# Patient Record
Sex: MALE | Race: WHITE | URBAN - METROPOLITAN AREA
[De-identification: names, ages, dates, MRNs, and addresses within clinical notes are randomized per-mention and may not be internally consistent; named-entity substitution may affect disease eponyms.]

---

## 2023-09-13 ENCOUNTER — HOSPITAL ENCOUNTER (OUTPATIENT)
Dept: DATA CONVERSION | Facility: HOSPITAL | Age: 88
Discharge: HOME | End: 2023-09-13

## 2023-09-13 DIAGNOSIS — M25.552 PAIN IN LEFT HIP: ICD-10-CM

## 2023-09-28 ENCOUNTER — HOSPITAL ENCOUNTER (OUTPATIENT)
Dept: DATA CONVERSION | Facility: HOSPITAL | Age: 88
Discharge: HOME | End: 2023-09-28
Payer: MEDICARE

## 2023-10-12 PROBLEM — R42 DIZZINESS: Status: ACTIVE | Noted: 2023-10-12

## 2023-10-12 PROBLEM — N17.9 ACUTE RENAL FAILURE SYNDROME (CMS-HCC): Status: ACTIVE | Noted: 2023-10-12

## 2023-10-12 PROBLEM — N23 RENAL COLIC: Status: ACTIVE | Noted: 2023-10-12

## 2023-10-12 PROBLEM — R33.8 ACUTE RETENTION OF URINE: Status: ACTIVE | Noted: 2023-10-12

## 2023-10-12 PROBLEM — H91.90 HEARING LOSS: Status: ACTIVE | Noted: 2023-10-12

## 2023-10-12 RX ORDER — CEPHALEXIN 500 MG/1
500 CAPSULE ORAL 3 TIMES DAILY
COMMUNITY
Start: 2022-11-21

## 2023-10-12 RX ORDER — MECLIZINE HYDROCHLORIDE 25 MG/1
25 TABLET ORAL 3 TIMES DAILY PRN
COMMUNITY
Start: 2022-11-21

## 2023-10-13 ENCOUNTER — TREATMENT (OUTPATIENT)
Dept: PHYSICAL THERAPY | Facility: CLINIC | Age: 88
End: 2023-10-13
Payer: MEDICARE

## 2023-10-13 DIAGNOSIS — M54.16 LEFT LUMBAR RADICULITIS: Primary | ICD-10-CM

## 2023-10-13 DIAGNOSIS — M25.552 PAIN IN LEFT HIP: ICD-10-CM

## 2023-10-13 DIAGNOSIS — M25.552 LEFT HIP PAIN: ICD-10-CM

## 2023-10-13 PROCEDURE — 97140 MANUAL THERAPY 1/> REGIONS: CPT | Mod: GP | Performed by: PHYSICAL THERAPIST

## 2023-10-13 PROCEDURE — 97110 THERAPEUTIC EXERCISES: CPT | Mod: GP | Performed by: PHYSICAL THERAPIST

## 2023-10-13 NOTE — PROGRESS NOTES
"Physical Therapy    Physical Therapy Treatment    Patient Name: Francis Martinez  MRN: [id number]  Today's Date: 10/13/2023  Time Calculation  Start Time: 1043  Stop Time: 1122  Time Calculation (min): 39 min    Visit 2/10  Assessment:   0 pain after tx, pt needed tc's to illicit F- TrA    Plan:   Continue per plan    Current Problem  1. Left lumbar radiculitis        2. Pain in left hip  PT eval and treat      3. Left hip pain            Subjective   General  General Comment: much less pain with doing HEP  Precautions   none    Pain   1    Objective   FIS min decr  EIS WFL    There-ex:  Tra 2 x 10, 5\"  Prone lye 2'  LIE 5 x 10\"  Ios hip add ball 5\" 2 x 10  Hip abd GTband 2 x 10    STM LB to decr pain and mm tension    STG:  I IN posture correction    LTG:  Pt to incr MMT of hips/core to 5/5 for less pain with IADL.  Pt to incr HS FLEXIBILITY to min decr for bending with less pain.  Decr pain level by 50% for lying and sitting.    "

## 2023-10-17 ENCOUNTER — TREATMENT (OUTPATIENT)
Dept: PHYSICAL THERAPY | Facility: CLINIC | Age: 88
End: 2023-10-17
Payer: MEDICARE

## 2023-10-17 DIAGNOSIS — M54.16 LEFT LUMBAR RADICULITIS: Primary | ICD-10-CM

## 2023-10-17 DIAGNOSIS — M25.552 LEFT HIP PAIN: ICD-10-CM

## 2023-10-17 DIAGNOSIS — M25.552 PAIN IN LEFT HIP: ICD-10-CM

## 2023-10-17 PROCEDURE — 97530 THERAPEUTIC ACTIVITIES: CPT | Mod: GP | Performed by: PHYSICAL THERAPIST

## 2023-10-17 PROCEDURE — 97110 THERAPEUTIC EXERCISES: CPT | Mod: GP | Performed by: PHYSICAL THERAPIST

## 2023-10-17 NOTE — PROGRESS NOTES
"Treatment  10/17/2023  [facility]       Luís Gordon, PT  Physical Therapy Left lumbar radiculitis +2 more  Dx PT Treatment; Referred by Tiff Ojeda DO  Reason for Visit     Progress Notes  Luís Gordon, PT (Physical Therapist)  Physical Therapy  Expand All Collapse All  Physical Therapy     Physical Therapy Treatment     Patient Name: Francis Martinez  MRN: [id number]  Today's Date: 10/17/2023  Time Calculation  Start Time: 1130  Stop Time: 1210  Time Calculation (min):40 min     Visit 3/10  Assessment:   4/10 L LB pain after tx, pt needed tc's to relax rectus with EIL  Plan:   assess response to cp and EIL at home     Current Problem  1. Left lumbar radiculitis          2. Pain in left hip  PT eval and treat       3. Left hip pain                   Subjective   General  General Comment: pt had less pain, but then it got worse again.  He is doing the new HEP at home.  Precautions   none     Pain   4-L LB and and groin     Objective []Expand by Default  Stand posture: trunk mild shift to the R    POSTURE:  seated F  POSTURE CORRECTION:  P     NE=No effect, C=centralize, A=abolish, P=peripheralize, p=produce, B=better, D=decrease, I=increase, NW=no worse, NB=no better     ROM S/S   FIS min NE   EIS wfl D   SG L WFL D   SG R MOD/marked decr W   Rep FIS D NB   Rep EIS I NW   Rep EIL D/C B          Pt provisional classification posterior derangement, now unloaded extension preference, but possible relevant lateral component    There-ex:  Prone lye 2'  LIE 5 x 10\"  ISO hip add ball 5\" 2 x 10  Hip abd GTband 2 x 10       DNP:  Tra 2 x 10, 5\"  STM LB to decr pain and mm tension     STG:  I IN posture correction     LTG:  Pt to incr MMT of hips/core to 5/5 for less pain with IADL.  Pt to incr HS FLEXIBILITY to min decr for bending with less pain.  Decr pain level by 50% for lying and sitting.           "

## 2023-10-19 ENCOUNTER — TREATMENT (OUTPATIENT)
Dept: PHYSICAL THERAPY | Facility: CLINIC | Age: 88
End: 2023-10-19
Payer: MEDICARE

## 2023-10-19 DIAGNOSIS — M25.552 PAIN IN LEFT HIP: ICD-10-CM

## 2023-10-19 DIAGNOSIS — M54.16 LEFT LUMBAR RADICULITIS: Primary | ICD-10-CM

## 2023-10-19 PROCEDURE — 97530 THERAPEUTIC ACTIVITIES: CPT | Mod: GP | Performed by: PHYSICAL THERAPIST

## 2023-10-19 PROCEDURE — 97014 ELECTRIC STIMULATION THERAPY: CPT | Mod: GP | Performed by: PHYSICAL THERAPIST

## 2023-10-19 NOTE — PROGRESS NOTES
"Treatment  10/17/2023  [facility]       Luís Gordon, PT  Physical Therapy Left lumbar radiculitis +2 more  Dx PT Treatment; Referred by Tiff Ojeda DO  Reason for Visit     Progress Notes  Luís Gordon, PT (Physical Therapist)  Physical Therapy  Expand All Collapse All  Physical Therapy     Physical Therapy Treatment     Patient Name: Francis Martinez  MRN: [id number]  Today's Date: 10/19/2023  Time Calculation  Start Time: 1432  Stop Time: 1510  Time Calculation (min): 38 min     Visit 4/10  Assessment:   cp and EIL at home did not help.  Plan to continue core stab, but to abandon press ups, EIS.  Plan:   advised pt consider aquatics for decompression of L spine     Current Problem  1. Left lumbar radiculitis          2. Pain in left hip  PT eval and treat       3. Left hip pain                Subjective   General  General Comment: pt states no change  Precautions   none     Pain   4-L LB and and groin     Objective []Expand by Default    objective testing and assessment of direction preference.  Pt is not responding to any direction performed today, or to lumbar PA and rot mobs trialed today.    Thermal:  Moist hot pack  Applied to:  lb  Patient position:  Seated  Comment:  to decr LBP    Time:  10'-with stim    Unattended electrical stimulation:  Other  Parameters:  Other  Patient position:  Seated  Electrodes placed:  L LB and L hip  Comment:  pre-mod, 24 intensity, to decr pain    Time:  10    Pt ed on aquatics and how may be beneficial, shown pool area, etc.    DNP:  There-ex:  Prone lye 2'  LIE 5 x 10\"  ISO hip add ball 5\" 2 x 10  Hip abd GTband 2 x 10     Tra 2 x 10, 5\"  STM LB to decr pain and mm tension     STG:  I IN posture correction     LTG:  Pt to incr MMT of hips/core to 5/5 for less pain with IADL.  Pt to incr HS FLEXIBILITY to min decr for bending with less pain.  Decr pain level by 50% for lying and sitting.           "

## 2023-10-24 ENCOUNTER — TREATMENT (OUTPATIENT)
Dept: PHYSICAL THERAPY | Facility: CLINIC | Age: 88
End: 2023-10-24
Payer: MEDICARE

## 2023-10-24 DIAGNOSIS — M25.552 PAIN IN LEFT HIP: ICD-10-CM

## 2023-10-24 DIAGNOSIS — M54.16 LEFT LUMBAR RADICULITIS: Primary | ICD-10-CM

## 2023-10-24 DIAGNOSIS — M25.552 LEFT HIP PAIN: ICD-10-CM

## 2023-10-24 PROCEDURE — 97110 THERAPEUTIC EXERCISES: CPT | Mod: GP | Performed by: PHYSICAL THERAPIST

## 2023-10-24 PROCEDURE — 97014 ELECTRIC STIMULATION THERAPY: CPT | Mod: GP | Performed by: PHYSICAL THERAPIST

## 2023-10-24 NOTE — PROGRESS NOTES
"Treatment  10/24/2023  [facility]       Luís Gordon, PT  Physical Therapy Left lumbar radiculitis +2 more  Dx PT Treatment; Referred by Tiff Ojeda DO  Reason for Visit     Progress Notes  Luís Gordon, PT (Physical Therapist)  Physical Therapy  Expand All Collapse All  Physical Therapy     Physical Therapy Treatment     Patient Name: Francis Martinez  MRN: [id number]  Today's Date: 10/24/2023  Time Calculation  Start Time: 1144  Stop Time: 1225  Time Calculation (min): 41 min     Visit 4/10  Assessment:  Plan to continue core stab, but to abandon press ups, EIS.  Plan:  CONTINUE core stab, flexibility work and modalities as needed     Current Problem  1. Left lumbar radiculitis          2. Pain in left hip  PT eval and treat       3. Left hip pain                Subjective   General  General Comment: pt states much better since last session, so does not think he will need pool.  Precautions   none     Pain   1/10 L LB pre, 0/10 post tx  Objective []Expand by Default  F+ PPT    Light therapy: infra and red light  Applied to:  L psis region  Patient position:  Left side lying  Comment:  Pt educated on light therapy and agrees to have this administered.  Eye protection utilized.    Time:  44\" x 3    Thermal:  Moist hot pack  Applied to:  lb  Patient position:  Seated  Comment:  to decr LBP. Skin checks OK    Time:  12'-with stim.      Unattended electrical stimulation:  Other  Parameters:  Other  Patient position:  Seated  Electrodes placed:  L LB and L hip  Comment:  pre-mod, 21 intensity, to decr pain    Time:  12'    There-ex:  PPT 5\" x 10  Bent knee fall outs 5\" x 10 ea  ISO hip add ball 5\" 2 x 10  Hip abd GTband 2 x 10  PPT with march x 10  Tra 2 x 10, 5\"  Hamstring stretch  2 x 30\" ea dnp         STG:  I IN posture correction     LTG:  Pt to incr MMT of hips/core to 5/5 for less pain with IADL.  Pt to incr HS FLEXIBILITY to min decr for bending with less pain.  " Decr pain level by 50% for lying and sitting.

## 2023-10-26 ENCOUNTER — TREATMENT (OUTPATIENT)
Dept: PHYSICAL THERAPY | Facility: CLINIC | Age: 88
End: 2023-10-26
Payer: MEDICARE

## 2023-10-26 DIAGNOSIS — M54.16 LEFT LUMBAR RADICULITIS: Primary | ICD-10-CM

## 2023-10-26 DIAGNOSIS — M25.552 LEFT HIP PAIN: ICD-10-CM

## 2023-10-26 DIAGNOSIS — M25.552 PAIN IN LEFT HIP: ICD-10-CM

## 2023-10-26 PROCEDURE — 97110 THERAPEUTIC EXERCISES: CPT | Mod: GP | Performed by: PHYSICAL THERAPIST

## 2023-10-26 NOTE — PROGRESS NOTES
"Treatment  10/26/2023  [facility]       Luís Gordon, PT  Physical Therapy Left lumbar radiculitis +2 more  Dx PT Treatment; Referred by Tiff Ojeda DO  Reason for Visit     Progress Notes  Luís Gordon, PT (Physical Therapist)  Physical Therapy  Expand All Collapse All  Physical Therapy     Physical Therapy Treatment     Patient Name: Francis Martinez  MRN: [id number]  Today's Date: 10/26/2023  Time Calculation  Start Time: 1426  Stop Time: 1506  Time Calculation (min): 39 min     Visit 5/10  Assessment:  Plan to continue core stab, but to abandon press ups, EIS.  Plan:  Add more stretching at home     Current Problem  1. Left lumbar radiculitis          2. Pain in left hip  PT eval and treat       3. Left hip pain                Subjective   General  General Comment: pt states much better.  Precautions   none     Pain   1/10 L LB pre, 0/10 post tx  Objective []Expand by Default  F+ PPT    There-ex:  PPT 5\" 2 x 10  Bent knee fall outs 5\" x 10 ea  ISO hip add ball 5\" 2 x 10  Hip abd GTband 2 x 10  PPT with march x 10  Tra 2 x 10, 5\"  Hamstring stretch  3 x 30\" ea   Piriformis stretch 3 x 30\"  hip flexor stretch 2 x 30\" ea      DNP:  B Shoulder flexion with rainbow ball, maintaining PPT 2 x 10 and x 10 ea diagonal  Light therapy: infra and red light  Applied to:  L psis region  Patient position:  Left side lying  Comment:  Pt educated on light therapy and agrees to have this administered.  Eye protection utilized.    Time:  44\" x 3    Thermal:  Moist hot pack  Applied to:  lb  Patient position:  Seated  Comment:  to decr LBP. Skin checks OK    Time:  12'-with stim.      Unattended electrical stimulation:  Other  Parameters:  Other  Patient position:  Seated  Electrodes placed:  L LB and L hip  Comment:  pre-mod, 21 intensity, to decr pain    Time:  12'    HEP issued 10/24:       STG:  I IN posture correction     LTG:  Pt to incr MMT of hips/core to 5/5 for less pain " with IADL.  Pt to incr HS FLEXIBILITY to min decr for bending with less pain.  Decr pain level by 50% for lying and sitting.

## 2023-11-02 ENCOUNTER — TREATMENT (OUTPATIENT)
Dept: PHYSICAL THERAPY | Facility: CLINIC | Age: 88
End: 2023-11-02
Payer: MEDICARE

## 2023-11-02 DIAGNOSIS — M54.16 LEFT LUMBAR RADICULITIS: Primary | ICD-10-CM

## 2023-11-02 DIAGNOSIS — M25.552 PAIN IN LEFT HIP: ICD-10-CM

## 2023-11-02 DIAGNOSIS — M25.552 LEFT HIP PAIN: ICD-10-CM

## 2023-11-02 PROCEDURE — 97110 THERAPEUTIC EXERCISES: CPT | Mod: GP | Performed by: PHYSICAL THERAPIST

## 2023-11-02 NOTE — PROGRESS NOTES
"Treatment  11/2/23  [facility]       Luís Gordon, PT  Physical Therapy Left lumbar radiculitis +2 more  Dx PT Treatment; Referred by Tiff Ojeda DO  Reason for Visit     Progress Notes  Luís Gordon, PT (Physical Therapist)  Physical Therapy  Expand All Collapse All  Physical Therapy     Physical Therapy Treatment     Patient Name: Francis Martinez  MRN: [id number]  Today's Date: 11/2/2023  Time Calculation  Start Time: 1516  Stop Time: 1554  Time Calculation (min): 39 min     Visit 5/10  Assessment:  Plan to continue core stab, but to abandon press ups, EIS.  Plan:  Add more stretching at home     Current Problem  1. Left lumbar radiculitis          2. Pain in left hip  PT eval and treat       3. Left hip pain                Subjective   General  General Comment: pt states much better, but stiff from raking leaves.    Precautions   none     Pain   0/10 L LB pre, 0/10 post tx  Objective []Expand by Default  F+/G-PPT    There-ex:  PPT 5\" 2 x 10  Bent knee fall outs 10\" x 5 ea  ISO hip add ball 3\" 2 x 10  Hip abd GTband 2 x 10  PPT with march x 10  Tra 2 x 10, 5\"  Hamstring stretch  3 x 30\" ea   Piriformis stretch 3 x 30\"  hip flexor stretch 2 x 30\" ea  Quad stretch 2 X 30\" EA  ITB 2 x 30\" ea    DNP:  B Shoulder flexion with rainbow ball, maintaining PPT 2 x 10 and x 10 ea diagonal  Light therapy: infra and red light  Applied to:  L psis region  Patient position:  Left side lying  Comment:  Pt educated on light therapy and agrees to have this administered.  Eye protection utilized.    Thermal:  Moist hot pack  Applied to:  lb  Patient position:  Seated  Comment:  to decr LBP. Skin checks OK    Unattended electrical stimulation:  Other  Parameters:  Other  Patient position:  Seated  Electrodes placed:  L LB and L hip  Comment:  pre-mod, 21 intensity, to decr pain      HEP issued 10/24:       STG:  I IN posture correction     LTG:  Pt to incr MMT of hips/core to 5/5 for " less pain with IADL.  Pt to incr HS FLEXIBILITY to min decr for bending with less pain.  Decr pain level by 50% for lying and sitting.            None

## 2023-11-07 ENCOUNTER — APPOINTMENT (OUTPATIENT)
Dept: PHYSICAL THERAPY | Facility: CLINIC | Age: 88
End: 2023-11-07
Payer: MEDICARE

## 2023-11-09 ENCOUNTER — TREATMENT (OUTPATIENT)
Dept: PHYSICAL THERAPY | Facility: CLINIC | Age: 88
End: 2023-11-09
Payer: MEDICARE

## 2023-11-09 DIAGNOSIS — M25.552 PAIN IN LEFT HIP: ICD-10-CM

## 2023-11-09 DIAGNOSIS — M54.16 LEFT LUMBAR RADICULITIS: Primary | ICD-10-CM

## 2023-11-09 DIAGNOSIS — M25.552 LEFT HIP PAIN: ICD-10-CM

## 2023-11-09 PROCEDURE — 97110 THERAPEUTIC EXERCISES: CPT | Mod: GP | Performed by: PHYSICAL THERAPIST

## 2023-11-09 ASSESSMENT — PAIN - FUNCTIONAL ASSESSMENT: PAIN_FUNCTIONAL_ASSESSMENT: 0-10

## 2023-11-09 ASSESSMENT — PAIN SCALES - GENERAL: PAINLEVEL_OUTOF10: 0 - NO PAIN

## 2023-11-09 NOTE — PROGRESS NOTES
"Physical Therapy Treatment    Patient Name: Francis Martinez  MRN: [id number]  Encounter date:  11/9/2023  Time Calculation  Start Time: 1430  Stop Time: 1510  Time Calculation (min): 40 min    Visit Number:  6 / 10   Visit Authorized:  10 per POC    Current Problem  1. Left lumbar radiculitis        2. Pain in left hip  PT eval and treat      3. Left hip pain          Precautions  Precautions  Precautions Comment: no precautions    Pain  Pain Assessment: 0-10  Pain Score: 0 - No pain    Subjective  General     Response to Previous Treatment: Patient with no complaints from previous session.  Trying to do exercises daily and is busy racking leaves;      Objective  Difficulty  abdominal brace from PPT,      Treatment:  Therapeutic Exercises  Bent knee fall outs 10\" x 5 ea  ISO hip add ball 3\" 2 x 10  Hip abd GTband 2 x 10  Brace with march x 10  Tra 2 x 10, 5\"  Brace with fall outs x 10   Brace with alternate UE x 10  Hamstring stretch   edge of chair 10 sec x 3 bilateral  Standing with core activation rows green band x 10   Standing with core activation shoulder extension green band x 10   Piriformis stretch 3 x 10 sec push/pull seated    Green band issued for HEP     Billed Treatment Times:  Therapeutic Exercise 40 min    OP EDUCATION:  Education  Individual(s) Educated: Patient  Education Provided: Anatomy, Home Exercise Program, POC  Patient/Caregiver Demonstrated Understanding: yes  Plan of Care Discussed and Agreed Upon: yes  Patient Response to Education: Patient/Caregiver Verbalized Understanding of Information, Patient/Caregiver Performed Return Demonstration of Exercises/Activities    Assessment:  PT Assessment  Rehab Prognosis: Good  Pt's response to treatment:  Patient needed cues on proper breathing during exercise.  Despite feeling no pain patient wants to keep next 2 sessions with primary therapist     Pain end of session:  0    Plan:  OP PT Plan  Treatment/Interventions: Education/ Instruction, " Electrical stimulation, Laser, Manual therapy, Neuromuscular re-education, Therapeutic exercises, Therapeutic activities  PT Plan: Skilled PT  PT Frequency: 2 times per week  Duration: 10 sessions  Certification Period End Date: 12/31/23  Number of Treatments Authorized: 10 sessions  Rehab Potential: Good  Plan of Care Agreement: Patient  Continue with current POC/no changes    Assessment of current progress against goals:  Progressing toward functional goals    OP Education:  Education  Individual(s) Educated: Patient  Education Provided: Anatomy, Home Exercise Program, POC  Patient/Caregiver Demonstrated Understanding: yes  Plan of Care Discussed and Agreed Upon: yes  Patient Response to Education: Patient/Caregiver Verbalized Understanding of Information, Patient/Caregiver Performed Return Demonstration of Exercises/Activities    Goals:  STGS  I IN posture correction     LTG:  Pt to incr MMT of hips/core to 5/5 for less pain with IADL.  Pt to incr HS FLEXIBILITY to min decr for bending with less pain.  Decr pain level by 50% for lying and sitting.

## 2023-11-14 ENCOUNTER — TREATMENT (OUTPATIENT)
Dept: PHYSICAL THERAPY | Facility: CLINIC | Age: 88
End: 2023-11-14
Payer: MEDICARE

## 2023-11-14 DIAGNOSIS — M25.552 PAIN IN LEFT HIP: ICD-10-CM

## 2023-11-14 DIAGNOSIS — M54.16 LEFT LUMBAR RADICULITIS: Primary | ICD-10-CM

## 2023-11-14 DIAGNOSIS — M25.552 LEFT HIP PAIN: ICD-10-CM

## 2023-11-14 PROCEDURE — 97110 THERAPEUTIC EXERCISES: CPT | Mod: GP | Performed by: PHYSICAL THERAPIST

## 2023-11-14 PROCEDURE — 97530 THERAPEUTIC ACTIVITIES: CPT | Mod: GP | Performed by: PHYSICAL THERAPIST

## 2023-11-14 NOTE — PROGRESS NOTES
"Physical Therapy Treatment, Progress report and Discharge    Patient Name: Francis Martinez  MRN: [id number]  Encounter date:  11/14/2023  Time Calculation  Start Time: 1000  Stop Time: 1040  Time Calculation (min): 40 min    Visit Number: 8  Visit Authorized:  10    Current Problem  1. Left lumbar radiculitis        2. Pain in left hip  PT eval and treat      3. Left hip pain          Precautions   None    Pain   0    Subjective  General      No pain lately    Objective  objective testing and assessment of pt's progress and discussion with pt on  recommendation to discharge to HEP- Pt agrees.  MMT:  Hip flexion B 5/5  Hip abd B 5/5  G PPT  WOMAC 2/96    HS flexibility:  R - 8, L -11    Treatment:  Therapeutic Exercises  PPT  5\" 2  10  Bent knee fall outs 10\" x 5 ea  ISO hip add ball 3\" 2 x 10  Hip abd GTband 2 x 10  Brace with march 2 x 10  Bridge x 10  Hamstring stretch supine with towel 4 x 10\" ea  Standing with core activation rows green band 2 x 15  Standing with core activation shoulder extension green band x 10   Piriformis stretch 3 x 10 sec push/pull seated  Hamstring stretch 3 x 30\" seated  Hip flexor stretch 2 x 45\" ea  Bike lv 5' lv 4     Billed Treatment Times:  Therapeutic Exercise 40 min    OP EDUCATION:   On HEP importance    Assessment:     Pt has met all goals.  Discharge PT.    Pain end of session:  0    Plan:  Discharge PT.    Assessment of current progress against goals:  Met goals.  I in final HEP after cues on progression.    OP Education:   On discharge, HEP    Goals:  STGS  I IN posture correction-goal met     LTG:  Pt to incr MMT of hips/core to 5/5 for less pain with IADL - goal met.  Pt to incr HS FLEXIBILITY to min decr for bending with less pain-goal met.  Decr pain level by 50% for lying and sitting-goal met.           "

## 2023-11-16 ENCOUNTER — APPOINTMENT (OUTPATIENT)
Dept: PHYSICAL THERAPY | Facility: CLINIC | Age: 88
End: 2023-11-16
Payer: MEDICARE

## 2024-03-13 ENCOUNTER — HOSPITAL ENCOUNTER (OUTPATIENT)
Dept: RADIOLOGY | Facility: HOSPITAL | Age: 89
Discharge: HOME | End: 2024-03-13
Payer: MEDICARE

## 2024-03-13 DIAGNOSIS — N20.0 CALCULUS OF KIDNEY: ICD-10-CM

## 2024-03-13 PROCEDURE — 74018 RADEX ABDOMEN 1 VIEW: CPT | Performed by: RADIOLOGY

## 2024-03-13 PROCEDURE — 74018 RADEX ABDOMEN 1 VIEW: CPT

## 2024-04-19 PROCEDURE — 82365 CALCULUS SPECTROSCOPY: CPT

## 2024-04-22 ENCOUNTER — HOSPITAL ENCOUNTER (OUTPATIENT)
Dept: RADIOLOGY | Facility: CLINIC | Age: 89
Discharge: HOME | End: 2024-04-22
Payer: MEDICARE

## 2024-04-22 ENCOUNTER — OFFICE VISIT (OUTPATIENT)
Dept: PRIMARY CARE | Facility: CLINIC | Age: 89
End: 2024-04-22
Payer: MEDICARE

## 2024-04-22 ENCOUNTER — LAB REQUISITION (OUTPATIENT)
Dept: LAB | Facility: HOSPITAL | Age: 89
End: 2024-04-22
Payer: MEDICARE

## 2024-04-22 VITALS
DIASTOLIC BLOOD PRESSURE: 80 MMHG | BODY MASS INDEX: 26.42 KG/M2 | OXYGEN SATURATION: 98 % | SYSTOLIC BLOOD PRESSURE: 140 MMHG | HEART RATE: 87 BPM | WEIGHT: 164.4 LBS | HEIGHT: 66 IN

## 2024-04-22 DIAGNOSIS — M25.561 ACUTE PAIN OF RIGHT KNEE: ICD-10-CM

## 2024-04-22 DIAGNOSIS — N21.0 CALCULUS IN BLADDER: ICD-10-CM

## 2024-04-22 DIAGNOSIS — M25.561 ACUTE PAIN OF RIGHT KNEE: Primary | ICD-10-CM

## 2024-04-22 PROCEDURE — 73564 X-RAY EXAM KNEE 4 OR MORE: CPT | Mod: RIGHT SIDE | Performed by: RADIOLOGY

## 2024-04-22 PROCEDURE — 1125F AMNT PAIN NOTED PAIN PRSNT: CPT | Performed by: STUDENT IN AN ORGANIZED HEALTH CARE EDUCATION/TRAINING PROGRAM

## 2024-04-22 PROCEDURE — 73564 X-RAY EXAM KNEE 4 OR MORE: CPT | Mod: RT

## 2024-04-22 PROCEDURE — 99213 OFFICE O/P EST LOW 20 MIN: CPT | Performed by: STUDENT IN AN ORGANIZED HEALTH CARE EDUCATION/TRAINING PROGRAM

## 2024-04-22 PROCEDURE — 1036F TOBACCO NON-USER: CPT | Performed by: STUDENT IN AN ORGANIZED HEALTH CARE EDUCATION/TRAINING PROGRAM

## 2024-04-22 PROCEDURE — 1159F MED LIST DOCD IN RCRD: CPT | Performed by: STUDENT IN AN ORGANIZED HEALTH CARE EDUCATION/TRAINING PROGRAM

## 2024-04-22 RX ORDER — METHYLPREDNISOLONE 4 MG/1
TABLET ORAL
Qty: 21 TABLET | Refills: 0 | Status: SHIPPED | OUTPATIENT
Start: 2024-04-22 | End: 2024-04-29

## 2024-04-22 RX ORDER — OXYCODONE AND ACETAMINOPHEN 5; 325 MG/1; MG/1
TABLET ORAL
COMMUNITY
Start: 2024-04-19

## 2024-04-22 ASSESSMENT — PATIENT HEALTH QUESTIONNAIRE - PHQ9
1. LITTLE INTEREST OR PLEASURE IN DOING THINGS: NOT AT ALL
2. FEELING DOWN, DEPRESSED OR HOPELESS: NOT AT ALL
SUM OF ALL RESPONSES TO PHQ9 QUESTIONS 1 AND 2: 0

## 2024-04-22 ASSESSMENT — PAIN SCALES - GENERAL: PAINLEVEL: 4

## 2024-04-22 NOTE — PROGRESS NOTES
"Subjective   Patient ID: Francis Martinez is a 88 y.o. male who presents for Knee Pain (Swelling) and Wound Check.    HPI  89 yo male here for knee pain and wound check  S/P bladder stone Sx  Surgery last week  Incision  R Knee pain  At night when his knee is extended   Had swelling  Currently on ibuprofen for surgery, so swelling decreased  Pain on and off for 1 month  First time he noticed, had been on a ladder  Saw chiropractor and pain improved  Morning stiffness, improves during the day  3. B/L rib pain  Was in awkward position during surgery with arms up  Notices pain when he raises his arms currently  No recent injury    Review of Systems  All pertinent positive symptoms are included in the history of present illness.    All other systems have been reviewed and are negative and noncontributory to this patient's current ailments.     No Known Allergies       There is no immunization history on file for this patient.    Objective   Vitals:    04/22/24 1126   BP: 140/80   Pulse: 87   SpO2: 98%   Weight: 74.6 kg (164 lb 6.4 oz)   Height: 1.676 m (5' 6\")       Physical Exam  CONSTITUTIONAL - well nourished, well developed, looks like stated age, in no acute distress  SKIN - 5 cm vertical well-healing incision down lower abdomen  HEAD - no trauma, normocephalic  EYES - extraocular muscles are intact  ENT - atraumatic  NECK - no neck mass was observed  LUNGS - non labored breathing  ABDOMEN - nondistended. Mild PTP in upper flank area B/L  EXTREMITIES - no edema, no deformities  MSK - R knee: PTP medially and laterally, and at joint line  NEUROLOGICAL - alert, oriented and no focal signs  PSYCHIATRIC - alert, pleasant and cordial, age-appropriate  IMMUNOLOGIC - no cervical lymphadenopathy     Assessment/Plan   89 yo male here for knee pain and wound check  S/P bladder stone Sx  Incision well-healing  2. R Knee pain  Xray, Start medrol dose pack  Start PT after recovered from Sx  3. B/L rib pain  Monitor  "

## 2024-04-24 ENCOUNTER — TELEPHONE (OUTPATIENT)
Dept: PRIMARY CARE | Facility: CLINIC | Age: 89
End: 2024-04-24
Payer: MEDICARE

## 2024-04-24 DIAGNOSIS — M25.561 ACUTE PAIN OF RIGHT KNEE: Primary | ICD-10-CM

## 2024-04-24 NOTE — TELEPHONE ENCOUNTER
Spoke with patient.  He stated his surgeon is ok with him taking the Medrol pack and that he can resume activity in 2 weeks.  He would like a referral to physical therapy.

## 2024-04-27 LAB
APPEARANCE STONE: NORMAL
COMPN STONE: NORMAL
SPECIMEN WT: 5370 MG